# Patient Record
Sex: MALE | Race: WHITE | Employment: OTHER | URBAN - METROPOLITAN AREA
[De-identification: names, ages, dates, MRNs, and addresses within clinical notes are randomized per-mention and may not be internally consistent; named-entity substitution may affect disease eponyms.]

---

## 2017-08-18 ENCOUNTER — SURG/PROC ORDERS (OUTPATIENT)
Dept: OPHTHALMOLOGY | Age: 72
End: 2017-08-18

## 2017-08-18 RX ORDER — TETRACAINE HYDROCHLORIDE 5 MG/ML
1 SOLUTION OPHTHALMIC ONCE
Status: CANCELLED | OUTPATIENT
Start: 2017-08-18 | End: 2017-08-18

## 2017-08-18 RX ORDER — SODIUM CHLORIDE 0.9 % (FLUSH) 0.9 %
10 SYRINGE (ML) INJECTION EVERY 12 HOURS SCHEDULED
Status: CANCELLED | OUTPATIENT
Start: 2017-08-18

## 2017-08-18 RX ORDER — CIPROFLOXACIN HYDROCHLORIDE 3.5 MG/ML
1 SOLUTION/ DROPS TOPICAL SEE ADMIN INSTRUCTIONS
Status: CANCELLED | OUTPATIENT
Start: 2017-08-18

## 2017-08-18 RX ORDER — SODIUM CHLORIDE 0.9 % (FLUSH) 0.9 %
10 SYRINGE (ML) INJECTION PRN
Status: CANCELLED | OUTPATIENT
Start: 2017-08-18

## 2017-08-25 ENCOUNTER — HOSPITAL ENCOUNTER (OUTPATIENT)
Dept: SURGERY | Age: 72
Discharge: HOME | End: 2017-08-25
Attending: OPHTHALMOLOGY | Admitting: OPHTHALMOLOGY

## 2017-08-25 VITALS
HEART RATE: 55 BPM | SYSTOLIC BLOOD PRESSURE: 137 MMHG | OXYGEN SATURATION: 99 % | DIASTOLIC BLOOD PRESSURE: 63 MMHG | RESPIRATION RATE: 10 BRPM | TEMPERATURE: 98 F

## 2017-08-25 LAB
GLUCOSE BLD-MCNC: 127 MG/DL (ref 70–99)
GLUCOSE BLD-MCNC: 142 MG/DL (ref 70–99)
PERFORMED ON: ABNORMAL
PERFORMED ON: ABNORMAL

## 2017-08-25 RX ORDER — SODIUM CHLORIDE 0.9 % (FLUSH) 0.9 %
10 SYRINGE (ML) INJECTION PRN
Status: DISCONTINUED | OUTPATIENT
Start: 2017-08-25 | End: 2017-08-26 | Stop reason: HOSPADM

## 2017-08-25 RX ORDER — TETRACAINE HYDROCHLORIDE 5 MG/ML
1 SOLUTION OPHTHALMIC ONCE
Status: COMPLETED | OUTPATIENT
Start: 2017-08-25 | End: 2017-08-25

## 2017-08-25 RX ORDER — SODIUM CHLORIDE 0.9 % (FLUSH) 0.9 %
10 SYRINGE (ML) INJECTION EVERY 12 HOURS SCHEDULED
Status: DISCONTINUED | OUTPATIENT
Start: 2017-08-25 | End: 2017-08-26 | Stop reason: HOSPADM

## 2017-08-25 RX ORDER — CIPROFLOXACIN HYDROCHLORIDE 3.5 MG/ML
1 SOLUTION/ DROPS TOPICAL SEE ADMIN INSTRUCTIONS
Status: COMPLETED | OUTPATIENT
Start: 2017-08-25 | End: 2017-08-25

## 2017-08-25 RX ORDER — SODIUM CHLORIDE 9 MG/ML
INJECTION, SOLUTION INTRAVENOUS CONTINUOUS
Status: DISCONTINUED | OUTPATIENT
Start: 2017-08-25 | End: 2017-08-26 | Stop reason: HOSPADM

## 2017-08-25 RX ADMIN — CIPROFLOXACIN HYDROCHLORIDE 1 DROP: 3.5 SOLUTION/ DROPS TOPICAL at 08:06

## 2017-08-25 RX ADMIN — TETRACAINE HYDROCHLORIDE 1 DROP: 5 SOLUTION OPHTHALMIC at 08:01

## 2017-08-25 RX ADMIN — CIPROFLOXACIN HYDROCHLORIDE 1 DROP: 3.5 SOLUTION/ DROPS TOPICAL at 08:00

## 2017-08-25 RX ADMIN — Medication: at 08:04

## 2017-08-25 ASSESSMENT — PAIN SCALES - GENERAL
PAINLEVEL_OUTOF10: 0
PAINLEVEL_OUTOF10: 0

## 2020-07-01 RX ORDER — DONEPEZIL HYDROCHLORIDE 10 MG/1
10 TABLET, FILM COATED ORAL NIGHTLY
COMMUNITY

## 2020-07-01 RX ORDER — MEMANTINE HYDROCHLORIDE 10 MG/1
10 TABLET ORAL 2 TIMES DAILY
COMMUNITY

## 2020-07-01 RX ORDER — LOSARTAN POTASSIUM 25 MG/1
25 TABLET ORAL DAILY
COMMUNITY

## 2020-07-01 RX ORDER — METOPROLOL TARTRATE 50 MG/1
50 TABLET, FILM COATED ORAL 2 TIMES DAILY
COMMUNITY

## 2020-07-01 NOTE — PROGRESS NOTES
[address] [facility] time__0950__________        Surgery time___1050_________    Take the following medications with a sip of water: Losartan, Amlodipine, Metoprolol     Do not eat or drink anything after 12:00 midnight prior to your surgery. This includes water chewing gum, mints and ice chips. You may brush your teeth and gargle the morning of your surgery, but do not swallow the water     Please see your family doctor/pediatrician for a history and physical and/or concerning medications. H&P 6/30/3030 [facility]   Bring any test results/reports from your physicians office. If you are under the care of a heart doctor or specialist doctor, please be aware that you may be asked to them for clearance    You may be asked to stop blood thinners such as Coumadin, Plavix, Fragmin, Lovenox, etc., or any anti-inflammatories such as:  Aspirin, Ibuprofen, Advil, Naproxen prior to your surgery. We also ask that you stop any OTC medications such as fish oil, vitamin E, glucosamine, garlic, Multivitamins, COQ 10, etc.    We ask that you do not smoke 24 hours prior to surgery  We ask that you do not  drink any alcoholic beverages 24 hours prior to surgery     You must make arrangements for a responsible adult to take you home after your surgery. For your safety you will not be allowed to leave alone or drive yourself home. Your surgery will be cancelled if you do not have a ride home. Also for your safety, it is strongly suggested that someone stay with you the first 24 hours after your surgery. A parent or legal guardian must accompany a child scheduled for surgery and plan to stay at the hospital until the child is discharged. Please do not bring other children with you. For your comfort, please wear simple loose fitting clothing to the hospital.  Please do not bring valuables. Wear short sleeve button down shirt or loose fitting shirt.  Bring eye drops or ointment day of surgery. Do not wear any make-up or nail polish on your fingers or toes      For your safety, please do not wear any jewelry or body piercing's on the day of surgery. All jewelry must be removed. If you have dentures, they will be removed before going to operating room. For your convenience, we will provide you with a container. If you wear contact lenses or glasses, they will be removed, please bring a case for them. If you have a living will and a durable power of  for healthcare, please bring in a copy. As part of our patient safety program to minimize surgical site infections, we ask you to do the following:    · Please notify your surgeon if you develop any illness between         now and the  day of your surgery. · This includes a cough, cold, fever, sore throat, nausea,         or vomiting, and diarrhea, etc.  ·  Please notify your surgeon if you experience dizziness, shortness         of breath or blurred vision between now and the time of your surgery. Do not shave your operative site 96 hours prior to surgery. For face and neck surgery, men may use an electric razor 48 hours   prior to surgery. You may shower the night before surgery or the morning of   your surgery with an antibacterial soap. You will need to bring a photo ID and insurance card    [facility] has an onsite pharmacy, would you like to utilize our pharmacy     If you will be staying overnight and use a C-pap machine, please bring   your C-pap to hospital     Our goal is to provide you with excellent care, therefore, visitors will be limited to two(2) in the room at a time so that we may focus on providing this care for you. Please contact pre-admission testing if you have any further questions.                  [facility] phone number:  [phone]  Please note these are generalized instructions for all surgical cases, you may be provided with more specific instructions according to your surgery.

## 2020-07-02 ENCOUNTER — PREP FOR PROCEDURE (OUTPATIENT)
Dept: OPHTHALMOLOGY | Age: 75
End: 2020-07-02

## 2020-07-02 RX ORDER — TETRACAINE HYDROCHLORIDE 5 MG/ML
1 SOLUTION OPHTHALMIC ONCE
Status: CANCELLED | OUTPATIENT
Start: 2020-07-02 | End: 2020-07-02

## 2020-07-02 RX ORDER — SODIUM CHLORIDE 0.9 % (FLUSH) 0.9 %
10 SYRINGE (ML) INJECTION PRN
Status: CANCELLED | OUTPATIENT
Start: 2020-07-02

## 2020-07-02 RX ORDER — CIPROFLOXACIN HYDROCHLORIDE 3.5 MG/ML
1 SOLUTION/ DROPS TOPICAL SEE ADMIN INSTRUCTIONS
Status: CANCELLED | OUTPATIENT
Start: 2020-07-02

## 2020-07-02 RX ORDER — SODIUM CHLORIDE 0.9 % (FLUSH) 0.9 %
10 SYRINGE (ML) INJECTION EVERY 12 HOURS SCHEDULED
Status: CANCELLED | OUTPATIENT
Start: 2020-07-02

## 2020-07-09 ENCOUNTER — ANESTHESIA EVENT (OUTPATIENT)
Dept: SURGERY | Age: 75
End: 2020-07-09
Payer: MEDICARE

## 2020-07-09 NOTE — PROGRESS NOTES
[address]        Pre-Op Phone Call:     Patient Name: Shelli Ellis     Telephone Information:   Mobile [phone]     Home phone:  [phone]    Surgery Time:   10:50 AM     Arrival Time:  4537     Left extended Message:  Yes     Message left with: spoke wife Khushboo     Recent change in health status:  No     Advised of transportation/ policy:  Yes     NPO policy reviewed:  Yes wife Khushboo     Advised to take morning heart/blood pressure medications with sips of water morning of surgery? Yes     Instructed to bring eye drops, photo identification, and insurance card day of surgery? Yes     Advised to wear short sleeved button down shirt (no T-shirt underneath):  Yes     Advised not to wear jewelry, hairpins, or pantyhose day of surgery? Yes     Advised not to wear make-up and to wash face day of surgery?   Yes    Remarks:        Electronically signed by:  Lynda Love RN at 7/9/2020 12:33 PM

## 2020-07-10 ENCOUNTER — HOSPITAL ENCOUNTER (OUTPATIENT)
Age: 75
Setting detail: OUTPATIENT SURGERY
Discharge: HOME | End: 2020-07-10
Attending: OPHTHALMOLOGY | Admitting: OPHTHALMOLOGY
Payer: MEDICARE

## 2020-07-10 ENCOUNTER — ANESTHESIA (OUTPATIENT)
Dept: SURGERY | Age: 75
End: 2020-07-10
Payer: MEDICARE

## 2020-07-10 VITALS
SYSTOLIC BLOOD PRESSURE: 131 MMHG | TEMPERATURE: 97 F | OXYGEN SATURATION: 93 % | RESPIRATION RATE: 14 BRPM | BODY MASS INDEX: 27.2 KG/M2 | HEART RATE: 69 BPM | DIASTOLIC BLOOD PRESSURE: 81 MMHG | WEIGHT: 190 LBS | HEIGHT: 70 IN

## 2020-07-10 VITALS — OXYGEN SATURATION: 99 % | TEMPERATURE: 98.6 F | DIASTOLIC BLOOD PRESSURE: 81 MMHG | SYSTOLIC BLOOD PRESSURE: 142 MMHG

## 2020-07-10 LAB
GLUCOSE BLD-MCNC: 133 MG/DL (ref 70–99)
GLUCOSE BLD-MCNC: 143 MG/DL (ref 70–99)
PERFORMED ON: ABNORMAL
PERFORMED ON: ABNORMAL

## 2020-07-10 PROCEDURE — 2580000003 HC RX 258: Performed by: NURSE ANESTHETIST, CERTIFIED REGISTERED

## 2020-07-10 PROCEDURE — 3700000001 HC ADD 15 MINUTES (ANESTHESIA): Performed by: OPHTHALMOLOGY

## 2020-07-10 PROCEDURE — 2500000003 HC RX 250 WO HCPCS: Performed by: OPHTHALMOLOGY

## 2020-07-10 PROCEDURE — 6370000000 HC RX 637 (ALT 250 FOR IP): Performed by: OPHTHALMOLOGY

## 2020-07-10 PROCEDURE — 7100000010 HC PHASE II RECOVERY - FIRST 15 MIN: Performed by: OPHTHALMOLOGY

## 2020-07-10 PROCEDURE — 2720000010 HC SURG SUPPLY STERILE: Performed by: OPHTHALMOLOGY

## 2020-07-10 PROCEDURE — 3700000000 HC ANESTHESIA ATTENDED CARE: Performed by: OPHTHALMOLOGY

## 2020-07-10 PROCEDURE — V2632 POST CHMBR INTRAOCULAR LENS: HCPCS | Performed by: OPHTHALMOLOGY

## 2020-07-10 PROCEDURE — 3600000004 HC SURGERY LEVEL 4 BASE: Performed by: OPHTHALMOLOGY

## 2020-07-10 PROCEDURE — 6360000002 HC RX W HCPCS: Performed by: NURSE ANESTHETIST, CERTIFIED REGISTERED

## 2020-07-10 PROCEDURE — 2709999900 HC NON-CHARGEABLE SUPPLY: Performed by: OPHTHALMOLOGY

## 2020-07-10 PROCEDURE — 3600000014 HC SURGERY LEVEL 4 ADDTL 15MIN: Performed by: OPHTHALMOLOGY

## 2020-07-10 DEVICE — LENS INTOCU +22.5 DIOPT L13MM DIA6MM 0DEG HAPTIC ANG A: Type: IMPLANTABLE DEVICE | Site: EYE | Status: FUNCTIONAL

## 2020-07-10 RX ORDER — BRIMONIDINE TARTRATE 2 MG/ML
SOLUTION/ DROPS OPHTHALMIC
Status: COMPLETED | OUTPATIENT
Start: 2020-07-10 | End: 2020-07-10

## 2020-07-10 RX ORDER — BALANCED SALT SOLUTION 6.4; .75; .48; .3; 3.9; 1.7 MG/ML; MG/ML; MG/ML; MG/ML; MG/ML; MG/ML
SOLUTION OPHTHALMIC
Status: COMPLETED | OUTPATIENT
Start: 2020-07-10 | End: 2020-07-10

## 2020-07-10 RX ORDER — SODIUM CHLORIDE 0.9 % (FLUSH) 0.9 %
10 SYRINGE (ML) INJECTION PRN
Status: DISCONTINUED | OUTPATIENT
Start: 2020-07-10 | End: 2020-07-10 | Stop reason: HOSPADM

## 2020-07-10 RX ORDER — MIDAZOLAM HYDROCHLORIDE 1 MG/ML
INJECTION INTRAMUSCULAR; INTRAVENOUS PRN
Status: DISCONTINUED | OUTPATIENT
Start: 2020-07-10 | End: 2020-07-10 | Stop reason: SDUPTHER

## 2020-07-10 RX ORDER — CIPROFLOXACIN HYDROCHLORIDE 3.5 MG/ML
1 SOLUTION/ DROPS TOPICAL SEE ADMIN INSTRUCTIONS
Status: DISCONTINUED | OUTPATIENT
Start: 2020-07-10 | End: 2020-07-10 | Stop reason: HOSPADM

## 2020-07-10 RX ORDER — SODIUM CHLORIDE 0.9 % (FLUSH) 0.9 %
10 SYRINGE (ML) INJECTION PRN
Status: DISCONTINUED | OUTPATIENT
Start: 2020-07-10 | End: 2020-07-10 | Stop reason: SDUPTHER

## 2020-07-10 RX ORDER — FENTANYL CITRATE 50 UG/ML
INJECTION, SOLUTION INTRAMUSCULAR; INTRAVENOUS PRN
Status: DISCONTINUED | OUTPATIENT
Start: 2020-07-10 | End: 2020-07-10 | Stop reason: SDUPTHER

## 2020-07-10 RX ORDER — TETRACAINE HYDROCHLORIDE 5 MG/ML
SOLUTION OPHTHALMIC
Status: COMPLETED | OUTPATIENT
Start: 2020-07-10 | End: 2020-07-10

## 2020-07-10 RX ORDER — SODIUM CHLORIDE 9 MG/ML
INJECTION, SOLUTION INTRAVENOUS CONTINUOUS PRN
Status: DISCONTINUED | OUTPATIENT
Start: 2020-07-10 | End: 2020-07-10 | Stop reason: SDUPTHER

## 2020-07-10 RX ORDER — TETRACAINE HYDROCHLORIDE 5 MG/ML
1 SOLUTION OPHTHALMIC ONCE
Status: COMPLETED | OUTPATIENT
Start: 2020-07-10 | End: 2020-07-10

## 2020-07-10 RX ORDER — OFLOXACIN 3 MG/ML
SOLUTION/ DROPS OPHTHALMIC
Status: COMPLETED | OUTPATIENT
Start: 2020-07-10 | End: 2020-07-10

## 2020-07-10 RX ORDER — SODIUM CHLORIDE 0.9 % (FLUSH) 0.9 %
10 SYRINGE (ML) INJECTION EVERY 12 HOURS SCHEDULED
Status: DISCONTINUED | OUTPATIENT
Start: 2020-07-10 | End: 2020-07-10 | Stop reason: HOSPADM

## 2020-07-10 RX ORDER — ONDANSETRON 2 MG/ML
4 INJECTION INTRAMUSCULAR; INTRAVENOUS
Status: DISCONTINUED | OUTPATIENT
Start: 2020-07-10 | End: 2020-07-10 | Stop reason: HOSPADM

## 2020-07-10 RX ORDER — SODIUM CHLORIDE 0.9 % (FLUSH) 0.9 %
10 SYRINGE (ML) INJECTION EVERY 12 HOURS SCHEDULED
Status: DISCONTINUED | OUTPATIENT
Start: 2020-07-10 | End: 2020-07-10 | Stop reason: SDUPTHER

## 2020-07-10 RX ORDER — SODIUM CHLORIDE 9 MG/ML
INJECTION, SOLUTION INTRAVENOUS CONTINUOUS
Status: DISCONTINUED | OUTPATIENT
Start: 2020-07-10 | End: 2020-07-10 | Stop reason: HOSPADM

## 2020-07-10 RX ADMIN — CIPROFLOXACIN 1 DROP: 3 SOLUTION OPHTHALMIC at 10:35

## 2020-07-10 RX ADMIN — SODIUM CHLORIDE: 9 INJECTION, SOLUTION INTRAVENOUS at 10:58

## 2020-07-10 RX ADMIN — Medication 0.5 ML: at 10:36

## 2020-07-10 RX ADMIN — POVIDONE-IODINE 0.1 ML: 5 SOLUTION OPHTHALMIC at 10:35

## 2020-07-10 RX ADMIN — MIDAZOLAM 1 MG: 1 INJECTION INTRAMUSCULAR; INTRAVENOUS at 11:03

## 2020-07-10 RX ADMIN — MIDAZOLAM 1 MG: 1 INJECTION INTRAMUSCULAR; INTRAVENOUS at 10:57

## 2020-07-10 RX ADMIN — FENTANYL CITRATE 50 MCG: 50 INJECTION INTRAMUSCULAR; INTRAVENOUS at 11:03

## 2020-07-10 RX ADMIN — FENTANYL CITRATE 50 MCG: 50 INJECTION INTRAMUSCULAR; INTRAVENOUS at 10:57

## 2020-07-10 RX ADMIN — TETRACAINE HYDROCHLORIDE 1 DROP: 5 SOLUTION OPHTHALMIC at 10:35

## 2020-07-10 RX ADMIN — Medication 0.5 ML: at 10:50

## 2020-07-10 ASSESSMENT — PAIN - FUNCTIONAL ASSESSMENT: PAIN_FUNCTIONAL_ASSESSMENT: 0-10

## 2020-07-10 ASSESSMENT — PAIN SCALES - GENERAL
PAINLEVEL_OUTOF10: 0
PAINLEVEL_OUTOF10: 0

## 2020-07-10 NOTE — OP NOTE
Judi Stinson    OPERATIVE NOTE    Preoperative Diagnosis: Cataract left eye, miotic or floppy pupil    Postoperative Diagnosis: Cataract left eye, miotic or floppy pupil    Procedure: Complex phacoemulsification with intraocular lens implantation, left eye  Surgeon: Alexia Parks MD    Anesthesia: MAC, topical.    Complications: none    Estimated blood loss: less than 1 ml. Specimens: none    Indications for procedure: The patient is a 76y.o. year old with decreased vision, glare and halos around lights, and trouble with activities of daily living. Examination revealed a visually significant cataract in the left eye. Risks, benefits, and alternatives to surgery were discussed with the patient and the patient elected to proceed with phacoemulsification with lens implantation. Details of the procedure: Following informed consent, the patient was taken to the operating room and placed in the supine position. Monitored anesthesia care was administered. The eye was prepped and draped in the usual sterile fashion using aseptic technique for cataract surgery. A side port incision was made. 1% preservative free lidocaine was injected through the side port incision for topical anesthesia. The eye was filled with viscoelastic and a 2.4 mm keratome blade was used to make a 3-plane clear corneal incision in the temporal cornea. A Malyugin ring was then inserted to adequately enlarge the pupil opening. The cystitome was used to make a tear in the anterior capsule and a Utrata forceps was used to make a complete curvilinear capsulorrhexis. The lens was hydrodissected and freely rotated. Phacoemulsification was performed. Irrigation/aspiration was used to remove all cortical material from the capsular bag. The eye was filled with viscoelastic and a foldable posterior chamber intraocular lens was injected into the capsular bag. The lens was rotated to the appropriate axis as needed.  The Malyugin ring was then removed from the eye. Irrigation/aspiration was used to remove all excess viscoelastic. The eye was pressurized with BSS and the wounds were check for leaks and none were found. The patient had ofloxacin and Alphagan solutions placed on the eye. The patient went to the PACU in excellent condition, having tolerated the procedure well.

## 2020-07-10 NOTE — ANESTHESIA POSTPROCEDURE EVALUATION
Department of Anesthesiology  Postprocedure Note    Patient: Sera Olson  MRN: [id number]  YOB: 1945  Date of evaluation: 7/10/2020  Time:  12:12 PM     Procedure Summary     Date:  07/10/20 Room / Location:  [facility]    Anesthesia Start:  2410 Anesthesia Stop:  9973    Procedure:  Phacoemulsification with intraocular lens implant (Left Eye) Diagnosis:       Combined forms of age-related cataract of left eye      (cataract of left eye)    Surgeon:  Edd Phoenix, MD Responsible Provider:  Wellington Vega MD    Anesthesia Type:  MAC ASA Status:  3          Anesthesia Type: MAC    Ban Phase I: Ban Score: 10    Ban Phase II: Ban Score: 10    Last vitals: Reviewed and per EMR flowsheets.        Anesthesia Post Evaluation    Patient location during evaluation: bedside  Patient participation: complete - patient participated  Level of consciousness: awake  Pain score: 0  Airway patency: patent  Nausea & Vomiting: no nausea and no vomiting  Complications: no  Cardiovascular status: blood pressure returned to baseline  Respiratory status: acceptable  Hydration status: euvolemic

## 2020-07-10 NOTE — ANESTHESIA PRE PROCEDURE
[facility] Department of Anesthesiology  Pre-Anesthesia Evaluation/Consultation       Name:  Toni Tirado  : 1945  Age:  76 y.o. MRN:  [id number]  Date: 7/10/2020           Surgeon: Surgeon(s):  Elvia Holcomb MD    Procedure: Procedure(s):  Phacoemulsification with intraocular lens implant     Allergies   Allergen Reactions    Biaxin [Clarithromycin]      Pt states he is not aware of a biaxin allergy     There is no problem list on file for this patient. Past Medical History:   Diagnosis Date    Atrial fibrillation ([address]     CAD (coronary artery disease)     Cerebral artery occlusion with cerebral infarction [facility]      spinal stroke     Diabetes mellitus ([facility]     Falls     GERD (gastroesophageal reflux disease)     Hard of hearing     Heart attack ([facility]     Hyperlipidemia     Hypertension     Memory deficit      Past Surgical History:   Procedure Laterality Date    CATARACT REMOVAL WITH IMPLANT Right     COLONOSCOPY      CORONARY ANGIOPLASTY WITH STENT PLACEMENT      and replacement 2 stents 2014     Social History     Tobacco Use    Smoking status: Never Smoker    Smokeless tobacco: Never Used   Substance Use Topics    Alcohol use: Yes     Comment: couple a day    Drug use: No     Medications  No current facility-administered medications on file prior to encounter.       Current Outpatient Medications on File Prior to Encounter   Medication Sig Dispense Refill    losartan (COZAAR) 25 MG tablet Take 25 mg by mouth daily      metoprolol tartrate (LOPRESSOR) 50 MG tablet Take 50 mg by mouth 2 times daily      donepezil (ARICEPT) 10 MG tablet Take 10 mg by mouth nightly      memantine (NAMENDA) 10 MG tablet Take 10 mg by mouth 2 times daily      amLODIPine (NORVASC) 10 MG tablet Take 10 mg by mouth daily      aspirin 81 MG tablet Take 81 mg by mouth daily      atorvastatin (LIPITOR) 40 MG tablet Take 40 mg by mouth daily  celecoxib (CELEBREX) 200 MG capsule Take 200 mg by mouth 2 times daily      warfarin (COUMADIN) 4 MG tablet Take 5 mg by mouth Indications: M-W-F half a tablet       tamsulosin (FLOMAX) 0.4 MG capsule Take 0.4 mg by mouth 2 times daily       gabapentin (NEURONTIN) 800 MG tablet Take 800 mg by mouth 3 times daily      metFORMIN (GLUCOPHAGE) 500 MG tablet Take 500 mg by mouth 2 times daily (with meals)      desipramine (NORPRAMIN) 75 MG tablet Take 75 mg by mouth daily      nitroGLYCERIN (NITROSTAT) 0.4 MG SL tablet Place 0.4 mg under the tongue every 5 minutes as needed for Chest pain up to max of 3 total doses. If no relief after 1 dose, call 911. No current facility-administered medications for this encounter. Vital Signs (Current)   Vitals:    07/01/20 0813   Weight: 190 lb (86.2 kg)   Height: 5' 10\" (1.778 m)                                          BP Readings from Last 3 Encounters:   08/25/17 137/63     Vital Signs Statistics (for past 48 hrs)     No data recorded  BP Readings from Last 3 Encounters:   08/25/17 137/63       BMI  Body mass index is 27.26 kg/m². Estimated body mass index is 27.26 kg/m² as calculated from the following:    Height as of this encounter: 5' 10\" (1.778 m). Weight as of this encounter: 190 lb (86.2 kg). CBC No results found for: WBC, RBC, HGB, HCT, MCV, RDW, PLT  CMP  No results found for: NA, K, CL, CO2, BUN, CREATININE, GFRAA, AGRATIO, LABGLOM, GLUCOSE, PROT, CALCIUM, BILITOT, ALKPHOS, AST, ALT  BMP  No results found for: NA, K, CL, CO2, BUN, CREATININE, CALCIUM, GFRAA, LABGLOM, GLUCOSE  POCGlucose  No results for input(s): GLUCOSE in the last 72 hours.    Coags  No results found for: PROTIME, INR, APTT  HCG (If Applicable) No results found for: PREGTESTUR, PREGSERUM, HCG, HCGQUANT   ABGs No results found for: PHART, PO2ART, NHD3VFM, DMO2GYA, BEART, F5QRUSFO   Type & Screen (If Applicable)  No results found for: LABABO, LABRH                         BMI: Wt Readings from Last 3 Encounters:       NPO Status:                          Anesthesia Evaluation  Patient summary reviewed no history of anesthetic complications:   Airway: Mallampati: II        Dental:          Pulmonary:                              Cardiovascular:    (+) hypertension:, past MI:, CAD:, CABG/stent:, dysrhythmias: atrial fibrillation, hyperlipidemia    (-) pacemaker                Neuro/Psych:   (+) CVA:,    (-) seizures           GI/Hepatic/Renal:   (+) GERD:,      (-) no renal disease, bowel prep and no morbid obesity       Endo/Other:    (+) DiabetesType II DM, , blood dyscrasia: anticoagulation therapy:., .                 Abdominal:           Vascular: negative vascular ROS. Anesthesia Plan      MAC     ASA 3       Induction: intravenous. MIPS: Prophylactic antiemetics administered. Anesthetic plan and risks discussed with patient. Plan discussed with CRNA. Attending anesthesiologist reviewed and agrees with Pre Eval content              This pre-anesthesia assessment may be used as a history and physical.    DOS STAFF ADDENDUM:    Pt seen and examined, chart reviewed (including anesthesia, drug and allergy history). No interval changes to history and physical examination. Anesthetic plan, risks, benefits, alternatives, and personnel involved discussed with patient. Patient verbalized an understanding and agrees to proceed.       Bakari Del Angel MD  July 10, 2020  9:51 AM

## (undated) DEVICE — Device: Brand: MEDEX

## (undated) DEVICE — SYRINGE TB 1ML NDL 25GA L0.625IN PLAS SLIP TIP CONVENTIONAL

## (undated) DEVICE — SYRINGE MED 30ML STD CLR PLAS LUERLOCK TIP N CTRL DISP

## (undated) DEVICE — Device: Brand: MALYUGIN RING SYSTEM 2.0, 6.25MM

## (undated) DEVICE — SOLUTION IV IRRIG WATER 500ML POUR BRL ST 2F7113

## (undated) DEVICE — SYRINGE MED 3ML CLR PLAS STD N CTRL LUERLOCK TIP DISP

## (undated) DEVICE — SOLUTION IRRIG BSS ST 500ML

## (undated) DEVICE — SURGICAL PROC PACK SHT WEST V4

## (undated) DEVICE — GLOVE,SURG,TRIUMPH MICRO,LTX,PF,7.5: Brand: MEDLINE

## (undated) DEVICE — Device